# Patient Record
Sex: FEMALE | Race: WHITE | NOT HISPANIC OR LATINO | Employment: FULL TIME | URBAN - METROPOLITAN AREA
[De-identification: names, ages, dates, MRNs, and addresses within clinical notes are randomized per-mention and may not be internally consistent; named-entity substitution may affect disease eponyms.]

---

## 2024-11-19 ENCOUNTER — HOSPITAL ENCOUNTER (OUTPATIENT)
Dept: RADIOLOGY | Facility: EXTERNAL LOCATION | Age: 44
Discharge: HOME | End: 2024-11-19

## 2024-11-19 ENCOUNTER — TELEPHONE (OUTPATIENT)
Dept: OBSTETRICS AND GYNECOLOGY | Facility: CLINIC | Age: 44
End: 2024-11-19
Payer: COMMERCIAL

## 2024-11-19 ENCOUNTER — HOSPITAL ENCOUNTER (OUTPATIENT)
Dept: RADIOLOGY | Facility: CLINIC | Age: 44
Discharge: HOME | End: 2024-11-19
Payer: COMMERCIAL

## 2024-11-19 DIAGNOSIS — Z12.31 ENCOUNTER FOR SCREENING MAMMOGRAM FOR BREAST CANCER: ICD-10-CM

## 2024-11-19 NOTE — TELEPHONE ENCOUNTER
Nguyen Forrest called back and stated that January was fine but to cancel appointment for now because she was driving and couldn't look at her schedule.    Inocencia Rangel MA

## 2024-11-19 NOTE — TELEPHONE ENCOUNTER
Called patient left voicemail in regards to rescheduling appointment for a new patient time slot.    Inocencia Rangel MA

## 2024-11-21 ENCOUNTER — LAB (OUTPATIENT)
Dept: LAB | Facility: LAB | Age: 44
End: 2024-11-21
Payer: COMMERCIAL

## 2024-11-21 DIAGNOSIS — Z00.00 LABORATORY EXAMINATION ORDERED AS PART OF A ROUTINE GENERAL MEDICAL EXAMINATION: ICD-10-CM

## 2024-11-21 DIAGNOSIS — E60 ZINC DEFICIENCY: ICD-10-CM

## 2024-11-21 DIAGNOSIS — E61.1 IRON DEFICIENCY: ICD-10-CM

## 2024-11-21 LAB
ALBUMIN SERPL BCP-MCNC: 4.5 G/DL (ref 3.4–5)
ALP SERPL-CCNC: 21 U/L (ref 33–110)
ALT SERPL W P-5'-P-CCNC: 15 U/L (ref 7–45)
ANION GAP SERPL CALC-SCNC: 10 MMOL/L (ref 10–20)
AST SERPL W P-5'-P-CCNC: 17 U/L (ref 9–39)
BILIRUB SERPL-MCNC: 0.7 MG/DL (ref 0–1.2)
BUN SERPL-MCNC: 12 MG/DL (ref 6–23)
CALCIUM SERPL-MCNC: 9.1 MG/DL (ref 8.6–10.3)
CHLORIDE SERPL-SCNC: 100 MMOL/L (ref 98–107)
CHOLEST SERPL-MCNC: 157 MG/DL (ref 0–199)
CHOLESTEROL/HDL RATIO: 2.2
CO2 SERPL-SCNC: 31 MMOL/L (ref 21–32)
CREAT SERPL-MCNC: 0.67 MG/DL (ref 0.5–1.05)
EGFRCR SERPLBLD CKD-EPI 2021: >90 ML/MIN/1.73M*2
GLUCOSE SERPL-MCNC: 75 MG/DL (ref 74–99)
HDLC SERPL-MCNC: 70.9 MG/DL
IRON SATN MFR SERPL: 22 % (ref 25–45)
IRON SERPL-MCNC: 67 UG/DL (ref 35–150)
LDLC SERPL CALC-MCNC: 77 MG/DL
NON HDL CHOLESTEROL: 86 MG/DL (ref 0–149)
POTASSIUM SERPL-SCNC: 4.2 MMOL/L (ref 3.5–5.3)
PROT SERPL-MCNC: 6.5 G/DL (ref 6.4–8.2)
SODIUM SERPL-SCNC: 137 MMOL/L (ref 136–145)
TIBC SERPL-MCNC: 309 UG/DL (ref 240–445)
TRIGL SERPL-MCNC: 46 MG/DL (ref 0–149)
UIBC SERPL-MCNC: 242 UG/DL (ref 110–370)
VLDL: 9 MG/DL (ref 0–40)

## 2024-11-21 PROCEDURE — 84630 ASSAY OF ZINC: CPT

## 2024-11-21 PROCEDURE — 83550 IRON BINDING TEST: CPT

## 2024-11-21 PROCEDURE — 83540 ASSAY OF IRON: CPT

## 2024-11-21 PROCEDURE — 36415 COLL VENOUS BLD VENIPUNCTURE: CPT

## 2024-11-21 PROCEDURE — 80061 LIPID PANEL: CPT

## 2024-11-21 PROCEDURE — 80053 COMPREHEN METABOLIC PANEL: CPT

## 2024-11-24 LAB — ZINC SERPL-MCNC: 93.8 UG/DL (ref 60–120)

## 2024-12-02 DIAGNOSIS — Z12.31 ENCOUNTER FOR SCREENING MAMMOGRAM FOR BREAST CANCER: Primary | ICD-10-CM

## 2024-12-02 NOTE — PROGRESS NOTES
Reordered mammogram for patient has she has new insurance, prior screening was not covered by her old insurance.    Communicated to patient via [employer]

## 2024-12-03 ENCOUNTER — APPOINTMENT (OUTPATIENT)
Dept: RADIOLOGY | Facility: CLINIC | Age: 44
End: 2024-12-03
Payer: COMMERCIAL

## 2024-12-04 ENCOUNTER — HOSPITAL ENCOUNTER (OUTPATIENT)
Dept: RADIOLOGY | Facility: CLINIC | Age: 44
Discharge: HOME | End: 2024-12-04
Payer: COMMERCIAL

## 2024-12-04 VITALS — HEIGHT: 60 IN | WEIGHT: 126.76 LBS | BODY MASS INDEX: 24.89 KG/M2

## 2024-12-04 DIAGNOSIS — Z12.31 ENCOUNTER FOR SCREENING MAMMOGRAM FOR BREAST CANCER: ICD-10-CM

## 2024-12-04 PROCEDURE — 77067 SCR MAMMO BI INCL CAD: CPT | Performed by: RADIOLOGY

## 2024-12-04 PROCEDURE — 77067 SCR MAMMO BI INCL CAD: CPT

## 2024-12-04 PROCEDURE — 77063 BREAST TOMOSYNTHESIS BI: CPT | Performed by: RADIOLOGY

## 2024-12-09 DIAGNOSIS — N63.20 MASS OF LEFT BREAST, UNSPECIFIED QUADRANT: Primary | ICD-10-CM

## 2024-12-09 DIAGNOSIS — R92.8 ABNORMAL MAMMOGRAM: ICD-10-CM

## 2024-12-09 NOTE — PROGRESS NOTES
Mass noted on left breast on mammo, we will order a follow up ultrasound for further investigation.

## 2024-12-11 ENCOUNTER — HOSPITAL ENCOUNTER (OUTPATIENT)
Dept: RADIOLOGY | Facility: CLINIC | Age: 44
Discharge: HOME | End: 2024-12-11
Payer: COMMERCIAL

## 2024-12-11 DIAGNOSIS — N63.20 MASS OF LEFT BREAST, UNSPECIFIED QUADRANT: Primary | ICD-10-CM

## 2024-12-11 DIAGNOSIS — R92.8 ABNORMAL MAMMOGRAM: ICD-10-CM

## 2024-12-11 DIAGNOSIS — N63.20 MASS OF LEFT BREAST, UNSPECIFIED QUADRANT: ICD-10-CM

## 2024-12-11 PROCEDURE — 76982 USE 1ST TARGET LESION: CPT | Mod: 50,LT

## 2024-12-11 PROCEDURE — 76642 ULTRASOUND BREAST LIMITED: CPT | Mod: LEFT SIDE | Performed by: STUDENT IN AN ORGANIZED HEALTH CARE EDUCATION/TRAINING PROGRAM

## 2024-12-11 PROCEDURE — 76642 ULTRASOUND BREAST LIMITED: CPT | Mod: LT

## 2024-12-13 ENCOUNTER — APPOINTMENT (OUTPATIENT)
Dept: OBSTETRICS AND GYNECOLOGY | Facility: CLINIC | Age: 44
End: 2024-12-13
Payer: COMMERCIAL

## 2025-01-03 ENCOUNTER — TELEPHONE (OUTPATIENT)
Dept: OBSTETRICS AND GYNECOLOGY | Facility: CLINIC | Age: 45
End: 2025-01-03
Payer: COMMERCIAL

## 2025-01-09 ENCOUNTER — OFFICE VISIT (OUTPATIENT)
Dept: OBSTETRICS AND GYNECOLOGY | Facility: CLINIC | Age: 45
End: 2025-01-09
Payer: COMMERCIAL

## 2025-01-09 ENCOUNTER — APPOINTMENT (OUTPATIENT)
Dept: OBSTETRICS AND GYNECOLOGY | Facility: CLINIC | Age: 45
End: 2025-01-09
Payer: COMMERCIAL

## 2025-01-09 VITALS
WEIGHT: 130 LBS | SYSTOLIC BLOOD PRESSURE: 100 MMHG | BODY MASS INDEX: 21.66 KG/M2 | HEIGHT: 65 IN | DIASTOLIC BLOOD PRESSURE: 60 MMHG

## 2025-01-09 DIAGNOSIS — Z01.419 WELL WOMAN EXAM WITH ROUTINE GYNECOLOGICAL EXAM: Primary | ICD-10-CM

## 2025-01-09 PROCEDURE — 3008F BODY MASS INDEX DOCD: CPT | Performed by: OBSTETRICS & GYNECOLOGY

## 2025-01-09 PROCEDURE — 99386 PREV VISIT NEW AGE 40-64: CPT | Performed by: OBSTETRICS & GYNECOLOGY

## 2025-01-09 PROCEDURE — 87624 HPV HI-RISK TYP POOLED RSLT: CPT

## 2025-01-09 PROCEDURE — 1036F TOBACCO NON-USER: CPT | Performed by: OBSTETRICS & GYNECOLOGY

## 2025-01-09 SDOH — ECONOMIC STABILITY: INCOME INSECURITY: IN THE LAST 12 MONTHS, WAS THERE A TIME WHEN YOU WERE NOT ABLE TO PAY THE MORTGAGE OR RENT ON TIME?: NO

## 2025-01-09 SDOH — ECONOMIC STABILITY: FOOD INSECURITY: WITHIN THE PAST 12 MONTHS, THE FOOD YOU BOUGHT JUST DIDN'T LAST AND YOU DIDN'T HAVE MONEY TO GET MORE.: NEVER TRUE

## 2025-01-09 SDOH — ECONOMIC STABILITY: FOOD INSECURITY: WITHIN THE PAST 12 MONTHS, YOU WORRIED THAT YOUR FOOD WOULD RUN OUT BEFORE YOU GOT MONEY TO BUY MORE.: NEVER TRUE

## 2025-01-09 SDOH — ECONOMIC STABILITY: TRANSPORTATION INSECURITY
IN THE PAST 12 MONTHS, HAS THE LACK OF TRANSPORTATION KEPT YOU FROM MEDICAL APPOINTMENTS OR FROM GETTING MEDICATIONS?: NO

## 2025-01-09 SDOH — ECONOMIC STABILITY: TRANSPORTATION INSECURITY
IN THE PAST 12 MONTHS, HAS LACK OF TRANSPORTATION KEPT YOU FROM MEETINGS, WORK, OR FROM GETTING THINGS NEEDED FOR DAILY LIVING?: NO

## 2025-01-09 ASSESSMENT — SOCIAL DETERMINANTS OF HEALTH (SDOH)
HOW HARD IS IT FOR YOU TO PAY FOR THE VERY BASICS LIKE FOOD, HOUSING, MEDICAL CARE, AND HEATING?: NOT HARD AT ALL
WITHIN THE LAST YEAR, HAVE YOU BEEN AFRAID OF YOUR PARTNER OR EX-PARTNER?: NO
WITHIN THE LAST YEAR, HAVE TO BEEN RAPED OR FORCED TO HAVE ANY KIND OF SEXUAL ACTIVITY BY YOUR PARTNER OR EX-PARTNER?: NO
WITHIN THE LAST YEAR, HAVE YOU BEEN HUMILIATED OR EMOTIONALLY ABUSED IN OTHER WAYS BY YOUR PARTNER OR EX-PARTNER?: NO
WITHIN THE LAST YEAR, HAVE YOU BEEN KICKED, HIT, SLAPPED, OR OTHERWISE PHYSICALLY HURT BY YOUR PARTNER OR EX-PARTNER?: NO

## 2025-01-09 ASSESSMENT — LIFESTYLE VARIABLES
HOW OFTEN DO YOU HAVE SIX OR MORE DRINKS ON ONE OCCASION: NEVER
HOW MANY STANDARD DRINKS CONTAINING ALCOHOL DO YOU HAVE ON A TYPICAL DAY: PATIENT DOES NOT DRINK
SKIP TO QUESTIONS 9-10: 1
AUDIT-C TOTAL SCORE: 1
HOW OFTEN DO YOU HAVE A DRINK CONTAINING ALCOHOL: MONTHLY OR LESS

## 2025-01-09 ASSESSMENT — PATIENT HEALTH QUESTIONNAIRE - PHQ9
SUM OF ALL RESPONSES TO PHQ9 QUESTIONS 1 & 2: 0
2. FEELING DOWN, DEPRESSED OR HOPELESS: NOT AT ALL
1. LITTLE INTEREST OR PLEASURE IN DOING THINGS: NOT AT ALL

## 2025-01-09 NOTE — PROGRESS NOTES
"Nguyen Forrest is a 44 y.o. female who is here for a annual exam.     Periods are regular every 28-30 days, lasting 5  days.     Sexually active:  Does not want to respond to this question    Regular self breast exam: no  Self breast exams encouraged    Menstrual History:  OB History          2    Para   1    Term   1            AB   1    Living   1         SAB   1    IAB        Ectopic        Multiple        Live Births   1                No LMP recorded (lmp unknown).         Review of Systems  All Normal Review of Systems  Constitutional: no fever, no chills, no recent weight gain, no recent weight loss and no fatigue.    Gastrointestinal: no abdominal pain, no constipation, no nausea, no diarrhea and no vomiting.    Genitourinary: no dysuria, no urinary incontinence, no vaginal dryness, no vaginal itching, no dyspareunia, no pelvic pain, no dysmenorrhea, no sexual problems, no change in urinary frequency, no vaginal discharge, no unexplained vaginal bleeding and no lesion/sore.    Breasts: No masses.  No nipple discharge.  No redness    Objective   /60   Ht 1.651 m (5' 5\")   Wt 59 kg (130 lb)   LMP  (LMP Unknown) Comment: defered to answer  BMI 21.63 kg/m²     OBGyn Exam   Physical Exam  Constitutional: Alert and in no acute distress. Well developed, well nourished.   Head and Face: Head and face: Normal.    Eyes: Normal external exam - nonicteric sclera, extraocular movements intact (EOMI) and no ptosis.   Ears, Nose, Mouth, and Throat: External inspection of ears and nose: Normal.    Neck: No neck asymmetry. Supple. Thyroid not enlarged and there were no palpable thyroid nodules.   Chest: Breasts: Normal appearance, no nipple discharge and no skin changes. Palpation of breasts and axillae: No palpable mass and no axillary lymphadenopathy.   Abdomen: Soft nontender; no abdominal mass palpated. No organomegaly. No hernias.     Genitourinary:   External genitalia: Normal. No inguinal " lymphadenopathy. Bartholin's Urethral and Skenes Glands: Normal. Urethra: Normal.    Bladder: Normal on palpation.   Vagina: Normal. No discharge  Cervix: Normal.    Uterus: Normal.    Right Adnexa/parametria: Normal.  Left Adnexa/parametria: Normal.    Inspection of Perianal Area: Normal.     Musculoskeletal: No joint swelling seen, normal movements of all extremities.   Skin: Normal skin color and pigmentation, normal skin turgor, and no rash.   Neurologic: Non-focal. Grossly intact.   Psychiatric: Alert and oriented x 3. Affect normal to patient baseline. Mood: Appropriate.      Assessment/Plan   1) Annual exam:  Pap with HPV testing completed  Orders in for follow-up breast ultrasound on the left due this summer.    Thank you again for your visit to our office today  Please follow-up as needed or in 1 year with your annual exam

## 2025-01-20 LAB
CYTOLOGY CMNT CVX/VAG CYTO-IMP: NORMAL
HPV HR 12 DNA GENITAL QL NAA+PROBE: NEGATIVE
HPV HR GENOTYPES PNL CVX NAA+PROBE: NEGATIVE
HPV16 DNA SPEC QL NAA+PROBE: NEGATIVE
HPV18 DNA SPEC QL NAA+PROBE: NEGATIVE
LAB AP HPV GENOTYPE QUESTION: YES
LAB AP HPV HR: NORMAL
LABORATORY COMMENT REPORT: NORMAL
PATH REPORT.TOTAL CANCER: NORMAL

## 2025-01-22 ENCOUNTER — TELEPHONE (OUTPATIENT)
Dept: OBSTETRICS AND GYNECOLOGY | Facility: CLINIC | Age: 45
End: 2025-01-22
Payer: COMMERCIAL

## 2025-01-22 NOTE — TELEPHONE ENCOUNTER
----- Message from Natalya Ruby sent at 1/20/2025 10:12 AM EST -----  Pap 6mo  ----- Message -----  From: Lab, Background User  Sent: 1/20/2025   9:57 AM EST  To: Natalya Ruby MD

## 2025-06-06 ENCOUNTER — TELEPHONE (OUTPATIENT)
Dept: PRIMARY CARE | Facility: CLINIC | Age: 45
End: 2025-06-06
Payer: COMMERCIAL

## 2025-06-06 NOTE — TELEPHONE ENCOUNTER
This appears to be a billing issue. Will copy Practice Lead on this message to assist with having this reviewed with billing/coding.

## 2025-06-11 ENCOUNTER — TELEPHONE (OUTPATIENT)
Dept: PRIMARY CARE | Facility: CLINIC | Age: 45
End: 2025-06-11
Payer: COMMERCIAL

## 2025-06-11 ENCOUNTER — HOSPITAL ENCOUNTER (OUTPATIENT)
Dept: RADIOLOGY | Facility: CLINIC | Age: 45
Discharge: HOME | End: 2025-06-11
Payer: COMMERCIAL

## 2025-06-11 DIAGNOSIS — N63.20 MASS OF LEFT BREAST, UNSPECIFIED QUADRANT: ICD-10-CM

## 2025-06-11 PROCEDURE — 76642 ULTRASOUND BREAST LIMITED: CPT | Mod: LT

## 2025-06-11 PROCEDURE — 76982 USE 1ST TARGET LESION: CPT

## 2025-06-11 PROCEDURE — 76642 ULTRASOUND BREAST LIMITED: CPT | Mod: LEFT SIDE | Performed by: STUDENT IN AN ORGANIZED HEALTH CARE EDUCATION/TRAINING PROGRAM

## 2025-06-11 NOTE — TELEPHONE ENCOUNTER
Patient  called  for the second time . She said her ultrasound for left  breast  needs to be coded preventative.

## 2025-06-12 NOTE — TELEPHONE ENCOUNTER
We escalated to our Practice Lead who reached out to the appropriate department to have this case reviewed. Once the case is reviewed, someone will reach out to her to discuss next steps. We can not change anything until we follow the appropriate protocol for review.

## 2025-06-13 DIAGNOSIS — Z12.31 ENCOUNTER FOR SCREENING MAMMOGRAM FOR BREAST CANCER: Primary | ICD-10-CM

## 2025-07-14 ENCOUNTER — APPOINTMENT (OUTPATIENT)
Dept: OBSTETRICS AND GYNECOLOGY | Facility: CLINIC | Age: 45
End: 2025-07-14

## 2025-07-14 VITALS
WEIGHT: 131 LBS | DIASTOLIC BLOOD PRESSURE: 66 MMHG | BODY MASS INDEX: 21.83 KG/M2 | SYSTOLIC BLOOD PRESSURE: 114 MMHG | HEIGHT: 65 IN

## 2025-07-14 DIAGNOSIS — R87.610 ASCUS OF CERVIX WITH NEGATIVE HIGH RISK HPV: Primary | ICD-10-CM

## 2025-07-14 PROCEDURE — 99213 OFFICE O/P EST LOW 20 MIN: CPT | Performed by: OBSTETRICS & GYNECOLOGY

## 2025-07-14 ASSESSMENT — PATIENT HEALTH QUESTIONNAIRE - PHQ9
2. FEELING DOWN, DEPRESSED OR HOPELESS: NOT AT ALL
1. LITTLE INTEREST OR PLEASURE IN DOING THINGS: NOT AT ALL
SUM OF ALL RESPONSES TO PHQ9 QUESTIONS 1 AND 2: 0

## 2025-07-14 NOTE — PROGRESS NOTES
"Subjective   Patient ID: Nguyen Forrest is a 45 y.o. female who presents for Abnormal Pap Smear.    Patient presents today for repeat Pap    ASCUS negative HPV Pap  For repeat today.  If the same or normal we will follow-up in 1 year with her annual exam    ROS  All Normal Review of Systems  Constitutional: no fever, no chills, no recent weight gain, no recent weight loss and no fatigue.    Gastrointestinal: no abdominal pain, no constipation, no nausea, no diarrhea and no vomiting.    Genitourinary: no dysuria, no urinary incontinence, no vaginal dryness, no vaginal itching, no dyspareunia, no pelvic pain, no dysmenorrhea, no sexual problems, no change in urinary frequency, no vaginal discharge, no unexplained vaginal bleeding and no lesion/sore.    Breasts: No masses.  No nipple discharge.  No redness    Objective   /66   Ht 1.651 m (5' 5\")   Wt 59.4 kg (131 lb)   BMI 21.80 kg/m²    Physical Exam  General: No distress.  Alert and oriented  Abdomen: Soft, nontender, nondistended  External Genitalia:  no masses.  no erythema.  no discharge noted.  Vagina:  no masses.  no discharge noted.    Cervix: no masses.    Uterus:  normal size and contour.  no masses. palpated  Adnexa: R: no masses, non tender.    Adnexa: L: no masses.  non tender  Extremities: No swelling  Psych: No sadness.      Assessment/Plan   1) ASCUS negative HPV Pap  Pap repeated today.  If the same or normal we will follow-up with her annual exam in January      Thank you for your visit to our office today.     "

## 2025-08-02 LAB
CYTOLOGY CMNT CVX/VAG CYTO-IMP: NORMAL
LAB AP HPV HR: NORMAL
LABORATORY COMMENT REPORT: NORMAL
PATH REPORT.TOTAL CANCER: NORMAL
RESIDENT REVIEW: NORMAL

## 2025-08-04 DIAGNOSIS — B96.89 BACTERIAL VAGINOSIS: Primary | ICD-10-CM

## 2025-08-04 DIAGNOSIS — N76.0 BACTERIAL VAGINOSIS: Primary | ICD-10-CM

## 2025-08-04 RX ORDER — METRONIDAZOLE 500 MG/1
500 TABLET ORAL 2 TIMES DAILY
Qty: 14 TABLET | Refills: 0 | Status: SHIPPED | OUTPATIENT
Start: 2025-08-04 | End: 2025-08-11